# Patient Record
Sex: MALE | Race: WHITE
[De-identification: names, ages, dates, MRNs, and addresses within clinical notes are randomized per-mention and may not be internally consistent; named-entity substitution may affect disease eponyms.]

---

## 2017-04-19 ENCOUNTER — HOSPITAL ENCOUNTER (EMERGENCY)
Dept: HOSPITAL 47 - EC | Age: 22
Discharge: HOME | End: 2017-04-19
Payer: COMMERCIAL

## 2017-04-19 VITALS — RESPIRATION RATE: 16 BRPM | DIASTOLIC BLOOD PRESSURE: 53 MMHG | SYSTOLIC BLOOD PRESSURE: 111 MMHG | HEART RATE: 62 BPM

## 2017-04-19 VITALS — TEMPERATURE: 96.8 F

## 2017-04-19 DIAGNOSIS — R42: ICD-10-CM

## 2017-04-19 DIAGNOSIS — R11.2: Primary | ICD-10-CM

## 2017-04-19 DIAGNOSIS — R19.7: ICD-10-CM

## 2017-04-19 DIAGNOSIS — F17.200: ICD-10-CM

## 2017-04-19 LAB
ALP SERPL-CCNC: 87 U/L (ref 38–126)
ALT SERPL-CCNC: 41 U/L (ref 21–72)
ANION GAP SERPL CALC-SCNC: 11 MMOL/L
AST SERPL-CCNC: 23 U/L (ref 17–59)
BASOPHILS # BLD AUTO: 0.1 K/UL (ref 0–0.2)
BASOPHILS NFR BLD AUTO: 1 %
BUN SERPL-SCNC: 7 MG/DL (ref 9–20)
CALCIUM SPEC-MCNC: 9.9 MG/DL (ref 8.4–10.2)
CH: 30
CHCM: 33.8
CHLORIDE SERPL-SCNC: 107 MMOL/L (ref 98–107)
CO2 SERPL-SCNC: 23 MMOL/L (ref 22–30)
EOSINOPHIL # BLD AUTO: 0 K/UL (ref 0–0.7)
EOSINOPHIL NFR BLD AUTO: 0 %
ERYTHROCYTE [DISTWIDTH] IN BLOOD BY AUTOMATED COUNT: 5.44 M/UL (ref 4.3–5.9)
ERYTHROCYTE [DISTWIDTH] IN BLOOD: 13.4 % (ref 11.5–15.5)
GLUCOSE SERPL-MCNC: 122 MG/DL (ref 74–99)
HCT VFR BLD AUTO: 48.5 % (ref 39–53)
HDW: 2.55
HGB BLD-MCNC: 16 GM/DL (ref 13–17.5)
LUC NFR BLD AUTO: 1 %
LYMPHOCYTES # SPEC AUTO: 1.3 K/UL (ref 1–4.8)
LYMPHOCYTES NFR SPEC AUTO: 8 %
MAGNESIUM SPEC-SCNC: 1.8 MG/DL (ref 1.6–2.3)
MCH RBC QN AUTO: 29.4 PG (ref 25–35)
MCHC RBC AUTO-ENTMCNC: 32.9 G/DL (ref 31–37)
MCV RBC AUTO: 89.2 FL (ref 80–100)
MONOCYTES # BLD AUTO: 0.7 K/UL (ref 0–1)
MONOCYTES NFR BLD AUTO: 4 %
NEUTROPHILS # BLD AUTO: 14.6 K/UL (ref 1.3–7.7)
NEUTROPHILS NFR BLD AUTO: 87 %
NON-AFRICAN AMERICAN GFR(MDRD): >60
POTASSIUM SERPL-SCNC: 4.1 MMOL/L (ref 3.5–5.1)
PROT SERPL-MCNC: 7.3 G/DL (ref 6.3–8.2)
SODIUM SERPL-SCNC: 141 MMOL/L (ref 137–145)
WBC # BLD AUTO: 0.09 10*3/UL
WBC # BLD AUTO: 16.8 K/UL (ref 3.8–10.6)
WBC (PEROX): 16.38

## 2017-04-19 PROCEDURE — 99283 EMERGENCY DEPT VISIT LOW MDM: CPT

## 2017-04-19 PROCEDURE — 80053 COMPREHEN METABOLIC PANEL: CPT

## 2017-04-19 PROCEDURE — 83735 ASSAY OF MAGNESIUM: CPT

## 2017-04-19 PROCEDURE — 85025 COMPLETE CBC W/AUTO DIFF WBC: CPT

## 2017-04-19 PROCEDURE — 36415 COLL VENOUS BLD VENIPUNCTURE: CPT

## 2017-04-19 PROCEDURE — 96360 HYDRATION IV INFUSION INIT: CPT

## 2017-04-19 NOTE — ED
Nausea/Vomiting/Diarrhea HPI





- General


Chief complaint: Nausea/Vomiting/Diarrhea


Stated complaint: Dizziness/Vomiting


Time Seen by Provider: 04/19/17 01:01


Source: patient, RN notes reviewed


Mode of arrival: ambulatory


Limitations: no limitations





- History of Present Illness


Initial comments: 





Patient is a 21-year-old male presents to the emergency room for evaluation of 

nausea, vomiting and diarrhea.  Patient states symptoms began today.  Patient 

states he feels very dizzy after vomiting.  Patient denies abdominal pain.  

Patient denies chest pain or shortness of breath.  Patient denies recent travel 

outside the country.  Patient denies sick contacts.  Patient denies trying new 

foods.  Patient denies chest pain, shortness of breath.  Patient just states he'

s been feeling very nauseous and can't get the vomiting to stop.  Patient 

denies any significant past mental history.  Patient denies any history of 

abdominal surgeries.  Patient states he's had diarrhea for the past 3 days.  

Patient denies blood in stools.  Patient denies any abnormal discoloration of 

stools.





- Related Data


 Previous Rx's











 Medication  Instructions  Recorded


 


Ibuprofen [Motrin] 600 mg PO Q8HR PRN #20 tab 08/22/15


 


Ondansetron Odt [Zofran Odt] 4 mg PO Q8HR PRN #12 tab 04/19/17











 Allergies











Allergy/AdvReac Type Severity Reaction Status Date / Time


 


No Known Allergies Allergy   Verified 04/19/17 00:26














Review of Systems


ROS Statement: 


Those systems with pertinent positive or pertinent negative responses have been 

documented in the HPI.





ROS Other: All systems not noted in ROS Statement are negative.





Past Medical History


Past Medical History: No Reported History


History of Any Multi-Drug Resistant Organisms: None Reported


Past Surgical History: Orthopedic Surgery


Past Psychological History: No Psychological Hx Reported


Smoking Status: Current every day smoker


Past Alcohol Use History: Rare


Past Drug Use History: Marijuana





General Exam





- General Exam Comments


Initial Comments: 





Sitting in exam room, no acute distress.


Limitations: no limitations


General appearance: alert, in no apparent distress


Head exam: Present: atraumatic, normocephalic, normal inspection


Eye exam: Present: normal appearance


ENT exam: Present: normal exam


Neck exam: Present: normal inspection


Respiratory exam: Present: normal lung sounds bilaterally.  Absent: respiratory 

distress


Cardiovascular Exam: Present: regular rate, normal rhythm, normal heart sounds


GI/Abdominal exam: Present: soft, normal bowel sounds.  Absent: distended, 

tenderness, guarding, rebound, rigid


Extremities exam: Present: normal inspection


Back exam: Present: normal inspection


Neurological exam: Present: alert, oriented X3, CN II-XII intact, normal gait


Psychiatric exam: Present: normal affect, normal mood


Skin exam: Present: warm, dry, intact, normal color.  Absent: rash





Course


 Vital Signs











  04/19/17





  00:24


 


Temperature 96.8 F L


 


Pulse Rate 70


 


Respiratory 20





Rate 


 


Blood Pressure 139/77


 


O2 Sat by Pulse 98





Oximetry 














Medical Decision Making





- Medical Decision Making





Patient is a 21-year-old male presents emergency room for impression nausea, 

vomiting and diarrhea.  Patient declined any nausea medication.  Patient was 

given a liter of fluids.  Patient states he is feeling much better and like to 

be discharged home.  Will send patient home with Zofran as needed advised 

patient to drink plenty of fluids.  Patient to follow-up with his primary care 

provider in 24-48 hours for reevaluation.  Patient states he understands 

everything that was discussed with him.  Return parameters discussed.  Case 

discussed with Dr. Vincent.





- Lab Data


Result diagrams: 


 04/19/17 01:05





 04/19/17 01:05


 Lab Results











  04/19/17 04/19/17 Range/Units





  01:05 01:05 


 


WBC  16.8 H   (3.8-10.6)  k/uL


 


RBC  5.44   (4.30-5.90)  m/uL


 


Hgb  16.0   (13.0-17.5)  gm/dL


 


Hct  48.5   (39.0-53.0)  %


 


MCV  89.2   (80.0-100.0)  fL


 


MCH  29.4   (25.0-35.0)  pg


 


MCHC  32.9   (31.0-37.0)  g/dL


 


RDW  13.4   (11.5-15.5)  %


 


Plt Count  252   (150-450)  k/uL


 


Neutrophils %  87   %


 


Lymphocytes %  8   %


 


Monocytes %  4   %


 


Eosinophils %  0   %


 


Basophils %  1   %


 


Neutrophils #  14.6 H   (1.3-7.7)  k/uL


 


Lymphocytes #  1.3   (1.0-4.8)  k/uL


 


Monocytes #  0.7   (0-1.0)  k/uL


 


Eosinophils #  0.0   (0-0.7)  k/uL


 


Basophils #  0.1   (0-0.2)  k/uL


 


Sodium   141  (137-145)  mmol/L


 


Potassium   4.1  (3.5-5.1)  mmol/L


 


Chloride   107  ()  mmol/L


 


Carbon Dioxide   23  (22-30)  mmol/L


 


Anion Gap   11  mmol/L


 


BUN   7 L  (9-20)  mg/dL


 


Creatinine   0.60 L  (0.66-1.25)  mg/dL


 


Est GFR (MDRD) Af Amer   >60  (>60 ml/min/1.73 sqM)  


 


Est GFR (MDRD) Non-Af   >60  (>60 ml/min/1.73 sqM)  


 


Glucose   122 H  (74-99)  mg/dL


 


Calcium   9.9  (8.4-10.2)  mg/dL


 


Magnesium   1.8  (1.6-2.3)  mg/dL


 


Total Bilirubin   0.7  (0.2-1.3)  mg/dL


 


AST   23  (17-59)  U/L


 


ALT   41  (21-72)  U/L


 


Alkaline Phosphatase   87  ()  U/L


 


Total Protein   7.3  (6.3-8.2)  g/dL


 


Albumin   4.5  (3.5-5.0)  g/dL














Disposition


Clinical Impression: 


 Nausea vomiting and diarrhea





Disposition: HOME SELF-CARE


Condition: Good


Instructions:  Gastroenteritis (ED)


Additional Instructions: 


Drink plenty of fluids.  Take Zofran as needed for nausea.  Clear liquid diet.  

Please follow up with primary care provider in 24-48 hours for reevaluation.  

If any new symptom arises or symptoms worsen, return to ER as soon as possible.

  


Prescriptions: 


Ondansetron Odt [Zofran Odt] 4 mg PO Q8HR PRN #12 tab


 PRN Reason: Nausea


Referrals: 


Kilo Deng MD [Primary Care Provider] - 1-2 days


Time of Disposition: 02:30

## 2021-04-22 ENCOUNTER — HOSPITAL ENCOUNTER (EMERGENCY)
Dept: HOSPITAL 47 - EC | Age: 26
Discharge: HOME | End: 2021-04-22
Payer: COMMERCIAL

## 2021-04-22 VITALS — TEMPERATURE: 98 F | DIASTOLIC BLOOD PRESSURE: 68 MMHG | SYSTOLIC BLOOD PRESSURE: 113 MMHG | RESPIRATION RATE: 16 BRPM

## 2021-04-22 VITALS — HEART RATE: 54 BPM

## 2021-04-22 DIAGNOSIS — N28.89: Primary | ICD-10-CM

## 2021-04-22 DIAGNOSIS — F17.200: ICD-10-CM

## 2021-04-22 LAB
ALBUMIN SERPL-MCNC: 4.5 G/DL (ref 3.5–5)
ALP SERPL-CCNC: 102 U/L (ref 38–126)
ALT SERPL-CCNC: 15 U/L (ref 4–49)
ANION GAP SERPL CALC-SCNC: 8 MMOL/L
AST SERPL-CCNC: 20 U/L (ref 17–59)
BASOPHILS # BLD AUTO: 0 K/UL (ref 0–0.2)
BASOPHILS NFR BLD AUTO: 0 %
BUN SERPL-SCNC: 12 MG/DL (ref 9–20)
CALCIUM SPEC-MCNC: 10.2 MG/DL (ref 8.4–10.2)
CHLORIDE SERPL-SCNC: 108 MMOL/L (ref 98–107)
CO2 SERPL-SCNC: 23 MMOL/L (ref 22–30)
EOSINOPHIL # BLD AUTO: 0 K/UL (ref 0–0.7)
EOSINOPHIL NFR BLD AUTO: 0 %
ERYTHROCYTE [DISTWIDTH] IN BLOOD BY AUTOMATED COUNT: 5.44 M/UL (ref 4.3–5.9)
ERYTHROCYTE [DISTWIDTH] IN BLOOD: 12.8 % (ref 11.5–15.5)
GLUCOSE SERPL-MCNC: 138 MG/DL (ref 74–99)
HCT VFR BLD AUTO: 48.1 % (ref 39–53)
HGB BLD-MCNC: 16 GM/DL (ref 13–17.5)
LIPASE SERPL-CCNC: 63 U/L (ref 23–300)
LYMPHOCYTES # SPEC AUTO: 1.6 K/UL (ref 1–4.8)
LYMPHOCYTES NFR SPEC AUTO: 13 %
MCH RBC QN AUTO: 29.3 PG (ref 25–35)
MCHC RBC AUTO-ENTMCNC: 33.2 G/DL (ref 31–37)
MCV RBC AUTO: 88.3 FL (ref 80–100)
MONOCYTES # BLD AUTO: 0.6 K/UL (ref 0–1)
MONOCYTES NFR BLD AUTO: 5 %
NEUTROPHILS # BLD AUTO: 10.3 K/UL (ref 1.3–7.7)
NEUTROPHILS NFR BLD AUTO: 81 %
PH UR: 7.5 [PH] (ref 5–8)
PLATELET # BLD AUTO: 259 K/UL (ref 150–450)
POTASSIUM SERPL-SCNC: 4.1 MMOL/L (ref 3.5–5.1)
PROT SERPL-MCNC: 7.3 G/DL (ref 6.3–8.2)
RBC UR QL: >182 /HPF (ref 0–5)
SODIUM SERPL-SCNC: 139 MMOL/L (ref 137–145)
SP GR UR: >1.05 (ref 1–1.03)
UROBILINOGEN UR QL STRIP: <2 MG/DL (ref ?–2)
WBC # BLD AUTO: 12.7 K/UL (ref 3.8–10.6)
WBC # UR AUTO: 2 /HPF (ref 0–5)

## 2021-04-22 PROCEDURE — 80053 COMPREHEN METABOLIC PANEL: CPT

## 2021-04-22 PROCEDURE — 99284 EMERGENCY DEPT VISIT MOD MDM: CPT

## 2021-04-22 PROCEDURE — 36415 COLL VENOUS BLD VENIPUNCTURE: CPT

## 2021-04-22 PROCEDURE — 83690 ASSAY OF LIPASE: CPT

## 2021-04-22 PROCEDURE — 83605 ASSAY OF LACTIC ACID: CPT

## 2021-04-22 PROCEDURE — 74177 CT ABD & PELVIS W/CONTRAST: CPT

## 2021-04-22 PROCEDURE — 96374 THER/PROPH/DIAG INJ IV PUSH: CPT

## 2021-04-22 PROCEDURE — 85025 COMPLETE CBC W/AUTO DIFF WBC: CPT

## 2021-04-22 PROCEDURE — 96375 TX/PRO/DX INJ NEW DRUG ADDON: CPT

## 2021-04-22 PROCEDURE — 81003 URINALYSIS AUTO W/O SCOPE: CPT

## 2021-04-22 NOTE — ED
Abdominal Pain HPI





- General


Chief Complaint: Abdominal Pain


Stated Complaint: Abd Pain, Vomiting


Source: patient


Mode of arrival: ambulatory


Limitations: no limitations





- History of Present Illness


Initial Comments: 


25-year-old male who presents emergency department with reported right-sided 

abdominal pain.  Patient states that it started early this morning and awoke him

from sleep.  Pain starts around his right upper quadrant radiates around to his 

right back.  It is tender to touch.  Patient has associated nausea and vomiting.

 Denies dysuria, hematuria or difficulty voiding.  Denies diarrhea, 

constipation, melenic stools or hematochezia.  Denies any chest pain or short

ness of breath.  No history of similar in the past.  No penile discharge.  No 

concern for sexually transmitted infections.  No scrotal pain.  No previous 

history of abdominal surgeries.  No other alleviating, precipitating or 

modifying factors








- Related Data


                                Home Medications











 Medication  Instructions  Recorded  Confirmed


 


No Known Home Medications  04/22/21 04/22/21











                                    Allergies











Allergy/AdvReac Type Severity Reaction Status Date / Time


 


No Known Allergies Allergy   Verified 04/22/21 10:26














Review of Systems


ROS Statement: 


Those systems with pertinent positive or pertinent negative responses have been 

documented in the HPI.





ROS Other: All systems not noted in ROS Statement are negative.





Past Medical History


Past Medical History: No Reported History


History of Any Multi-Drug Resistant Organisms: None Reported


Past Surgical History: Orthopedic Surgery


Past Psychological History: No Psychological Hx Reported


Smoking Status: Current every day smoker


Past Alcohol Use History: Rare


Past Drug Use History: Marijuana





General Exam


Limitations: no limitations





Course


                                   Vital Signs











  04/22/21 04/22/21





  09:51 13:41


 


Temperature 97.6 F 98 F


 


Pulse Rate 54 L 54 L


 


Respiratory 18 16





Rate  


 


Blood Pressure 145/82 113/68


 


O2 Sat by Pulse 100 98





Oximetry  














- Reevaluation(s)


Reevaluation #1: 


04/22/21 13:18 spoke with Dr. Amaya.  Patient admits to follow-up in the office.





Medical Decision Making





- Medical Decision Making





Upon arrival patient's placed into room 15.  Thorough history and physical exam 

was performed. he was established.  Patient was given a dose of Toradol and 

Zofran for his pain and nausea.  Laboratory studies were conducted. Urinalysis 

does demonstrate greater than 182 red blood cells.  CT demonstrates complex mass

upper pole left kidney. Spoke with Dr. Amaya in guarded stiffness.  States the 

patient can follow up in office.  These results are discussed the patient and 

the importance of him following up in Dr. Butler's office.  He does understand 

this.  He is take Motrin and Tylenol alternating for his pain.  Return to the 

emergency room for any worsening symptoms.  Patient is discharged in stable 

condition. 





- Lab Data


Result diagrams: 


                                 04/22/21 10:13





                                 04/22/21 10:13


                                   Lab Results











  04/22/21 04/22/21 04/22/21 Range/Units





  10:13 10:13 10:13 


 


WBC  12.7 H    (3.8-10.6)  k/uL


 


RBC  5.44    (4.30-5.90)  m/uL


 


Hgb  16.0    (13.0-17.5)  gm/dL


 


Hct  48.1    (39.0-53.0)  %


 


MCV  88.3    (80.0-100.0)  fL


 


MCH  29.3    (25.0-35.0)  pg


 


MCHC  33.2    (31.0-37.0)  g/dL


 


RDW  12.8    (11.5-15.5)  %


 


Plt Count  259    (150-450)  k/uL


 


MPV  7.6    


 


Neutrophils %  81    %


 


Lymphocytes %  13    %


 


Monocytes %  5    %


 


Eosinophils %  0    %


 


Basophils %  0    %


 


Neutrophils #  10.3 H    (1.3-7.7)  k/uL


 


Lymphocytes #  1.6    (1.0-4.8)  k/uL


 


Monocytes #  0.6    (0-1.0)  k/uL


 


Eosinophils #  0.0    (0-0.7)  k/uL


 


Basophils #  0.0    (0-0.2)  k/uL


 


Sodium   139   (137-145)  mmol/L


 


Potassium   4.1   (3.5-5.1)  mmol/L


 


Chloride   108 H   ()  mmol/L


 


Carbon Dioxide   23   (22-30)  mmol/L


 


Anion Gap   8   mmol/L


 


BUN   12   (9-20)  mg/dL


 


Creatinine   0.82   (0.66-1.25)  mg/dL


 


Est GFR (CKD-EPI)AfAm   >90   (>60 ml/min/1.73 sqM)  


 


Est GFR (CKD-EPI)NonAf   >90   (>60 ml/min/1.73 sqM)  


 


Glucose   138 H   (74-99)  mg/dL


 


Plasma Lactic Acid Kelvin    1.6  (0.7-2.0)  mmol/L


 


Calcium   10.2   (8.4-10.2)  mg/dL


 


Total Bilirubin   0.5   (0.2-1.3)  mg/dL


 


AST   20   (17-59)  U/L


 


ALT   15   (4-49)  U/L


 


Alkaline Phosphatase   102   ()  U/L


 


Total Protein   7.3   (6.3-8.2)  g/dL


 


Albumin   4.5   (3.5-5.0)  g/dL


 


Lipase   63   ()  U/L


 


Urine Color     


 


Urine Appearance     (Clear)  


 


Urine pH     (5.0-8.0)  


 


Ur Specific Gravity     (1.001-1.035)  


 


Urine Protein     (Negative)  


 


Urine Glucose (UA)     (Negative)  


 


Urine Ketones     (Negative)  


 


Urine Blood     (Negative)  


 


Urine Nitrite     (Negative)  


 


Urine Bilirubin     (Negative)  


 


Urine Urobilinogen     (<2.0)  mg/dL


 


Ur Leukocyte Esterase     (Negative)  


 


Urine RBC     (0-5)  /hpf


 


Urine WBC     (0-5)  /hpf


 


Urine Mucus     (None)  /hpf














  04/22/21 Range/Units





  12:09 


 


WBC   (3.8-10.6)  k/uL


 


RBC   (4.30-5.90)  m/uL


 


Hgb   (13.0-17.5)  gm/dL


 


Hct   (39.0-53.0)  %


 


MCV   (80.0-100.0)  fL


 


MCH   (25.0-35.0)  pg


 


MCHC   (31.0-37.0)  g/dL


 


RDW   (11.5-15.5)  %


 


Plt Count   (150-450)  k/uL


 


MPV   


 


Neutrophils %   %


 


Lymphocytes %   %


 


Monocytes %   %


 


Eosinophils %   %


 


Basophils %   %


 


Neutrophils #   (1.3-7.7)  k/uL


 


Lymphocytes #   (1.0-4.8)  k/uL


 


Monocytes #   (0-1.0)  k/uL


 


Eosinophils #   (0-0.7)  k/uL


 


Basophils #   (0-0.2)  k/uL


 


Sodium   (137-145)  mmol/L


 


Potassium   (3.5-5.1)  mmol/L


 


Chloride   ()  mmol/L


 


Carbon Dioxide   (22-30)  mmol/L


 


Anion Gap   mmol/L


 


BUN   (9-20)  mg/dL


 


Creatinine   (0.66-1.25)  mg/dL


 


Est GFR (CKD-EPI)AfAm   (>60 ml/min/1.73 sqM)  


 


Est GFR (CKD-EPI)NonAf   (>60 ml/min/1.73 sqM)  


 


Glucose   (74-99)  mg/dL


 


Plasma Lactic Acid Kelvin   (0.7-2.0)  mmol/L


 


Calcium   (8.4-10.2)  mg/dL


 


Total Bilirubin   (0.2-1.3)  mg/dL


 


AST   (17-59)  U/L


 


ALT   (4-49)  U/L


 


Alkaline Phosphatase   ()  U/L


 


Total Protein   (6.3-8.2)  g/dL


 


Albumin   (3.5-5.0)  g/dL


 


Lipase   ()  U/L


 


Urine Color  Light Yellow  


 


Urine Appearance  Clear  (Clear)  


 


Urine pH  7.5  (5.0-8.0)  


 


Ur Specific Gravity  >1.050 H  (1.001-1.035)  


 


Urine Protein  Negative  (Negative)  


 


Urine Glucose (UA)  Negative  (Negative)  


 


Urine Ketones  Negative  (Negative)  


 


Urine Blood  Large H  (Negative)  


 


Urine Nitrite  Negative  (Negative)  


 


Urine Bilirubin  Negative  (Negative)  


 


Urine Urobilinogen  <2.0  (<2.0)  mg/dL


 


Ur Leukocyte Esterase  Negative  (Negative)  


 


Urine RBC  >182 H  (0-5)  /hpf


 


Urine WBC  2  (0-5)  /hpf


 


Urine Mucus  Rare H  (None)  /hpf














Disposition


Clinical Impression: 


 Right flank pain, Hematuria, Left kidney mass





Disposition: HOME SELF-CARE


Condition: Stable


Instructions (If sedation given, give patient instructions):  Flank Pain (ED)


Additional Instructions: 


You have a mass on your left kidney. you need further workup to determine what 

this mass is. Please call and make an appointment with the urologist. Alternate 

taking Motrin and Tylenol for pain control. Return to the ED for any new or 

worsening symptoms. 


Is patient prescribed a controlled substance at d/c from ED?: No


Referrals: 


Kilo Deng MD [Primary Care Provider] - 1-2 days


Brayan Amaya MD [STAFF PHYSICIAN] - 1-2 days


Time of Disposition: 13:35

## 2021-04-22 NOTE — CT
EXAMINATION TYPE: CT abdomen pelvis w con

 

DATE OF EXAM: 4/22/2021

 

COMPARISON: None

 

HISTORY: Right sided pain with nausea

 

CT DLP: 1408.7 mGycm

 

CONTRAST: 

CT scan of the abdomen and pelvis is performed without Oral Contrast and with IV Contrast, patient in
jected with 100 mL of Isovue 300.

 

FINDINGS: 

LUNG BASES-: Small pleural effusions left greater than right as well as nodular infiltrate right lowe
r lobe linear atelectasis or parenchymal scar at the left lung base.

 

LIVER/GB:   No calcified gallstones.  No space occupying hepatic lesion. Biliary tree is of normal ca
liber. 

 

PANCREAS:  No inflammation.  No distinct mass. 

 

SPLEEN:  No splenic enlargement.  No lesion seen. 

 

ADRENALS:  No nodule.  No thickening. 

 

KIDNEYS/BLADDER:  No hydronephrosis.  No nephrolithiasis. There is a complex mass upper pole left kid
elizabeth posterior cortex measuring 1.8 x 2.4 x 2.9 cm. Malignancy is not excluded. No additional renal le
sions are detected. Urinary bladder grossly unremarkable. 

 

BOWEL: Normal appendix.  Normal bowel caliber.  No inflammation.

 

GENITAL ORGANS:  No gross abnormality. 

 

LYMPH NODES:  No greater than 1cm abdominal or pelvic lymph nodes are appreciated.

 

AORTA: No significant abnormality. 

 

OSSEOUS STRUCTURES:  No significant abnormality is seen. 

 

OTHER:  No significant additional abnormality is seen. 

 

IMPRESSION: 

1. Urology consult recommended for complex mass upper pole left kidney. Malignancy is not excluded.

2. No acute intra-abdominal process seen.

3. Small pleural effusions as well as nodular infiltrate right lower lobe areas of linear atelectasis
 left lung base.

## 2022-09-17 ENCOUNTER — HOSPITAL ENCOUNTER (EMERGENCY)
Dept: HOSPITAL 47 - EC | Age: 27
Discharge: HOME | End: 2022-09-17
Payer: COMMERCIAL

## 2022-09-17 VITALS
SYSTOLIC BLOOD PRESSURE: 124 MMHG | DIASTOLIC BLOOD PRESSURE: 78 MMHG | TEMPERATURE: 97.8 F | RESPIRATION RATE: 15 BRPM | HEART RATE: 60 BPM

## 2022-09-17 DIAGNOSIS — F17.200: ICD-10-CM

## 2022-09-17 DIAGNOSIS — N13.2: Primary | ICD-10-CM

## 2022-09-17 LAB
ALBUMIN SERPL-MCNC: 4.4 G/DL (ref 3.5–5)
ALP SERPL-CCNC: 100 U/L (ref 38–126)
ALT SERPL-CCNC: 16 U/L (ref 4–49)
AMYLASE SERPL-CCNC: 54 U/L (ref 30–110)
ANION GAP SERPL CALC-SCNC: 14 MMOL/L
AST SERPL-CCNC: 23 U/L (ref 17–59)
BASOPHILS # BLD AUTO: 0.1 K/UL (ref 0–0.2)
BASOPHILS NFR BLD AUTO: 0 %
BUN SERPL-SCNC: 10 MG/DL (ref 9–20)
CALCIUM SPEC-MCNC: 9.6 MG/DL (ref 8.4–10.2)
CHLORIDE SERPL-SCNC: 104 MMOL/L (ref 98–107)
CO2 SERPL-SCNC: 19 MMOL/L (ref 22–30)
EOSINOPHIL # BLD AUTO: 0.1 K/UL (ref 0–0.7)
EOSINOPHIL NFR BLD AUTO: 1 %
ERYTHROCYTE [DISTWIDTH] IN BLOOD BY AUTOMATED COUNT: 5.6 M/UL (ref 4.3–5.9)
ERYTHROCYTE [DISTWIDTH] IN BLOOD: 13 % (ref 11.5–15.5)
GLUCOSE SERPL-MCNC: 135 MG/DL (ref 74–99)
HCT VFR BLD AUTO: 51.2 % (ref 39–53)
HGB BLD-MCNC: 16.2 GM/DL (ref 13–17.5)
LIPASE SERPL-CCNC: 95 U/L (ref 23–300)
LYMPHOCYTES # SPEC AUTO: 1.6 K/UL (ref 1–4.8)
LYMPHOCYTES NFR SPEC AUTO: 12 %
MCH RBC QN AUTO: 28.9 PG (ref 25–35)
MCHC RBC AUTO-ENTMCNC: 31.6 G/DL (ref 31–37)
MCV RBC AUTO: 91.5 FL (ref 80–100)
MONOCYTES # BLD AUTO: 0.6 K/UL (ref 0–1)
MONOCYTES NFR BLD AUTO: 5 %
NEUTROPHILS # BLD AUTO: 11 K/UL (ref 1.3–7.7)
NEUTROPHILS NFR BLD AUTO: 82 %
PH UR: 5.5 [PH] (ref 5–8)
PLATELET # BLD AUTO: 257 K/UL (ref 150–450)
POTASSIUM SERPL-SCNC: 4.4 MMOL/L (ref 3.5–5.1)
PROT SERPL-MCNC: 7 G/DL (ref 6.3–8.2)
PROT UR QL: (no result)
RBC UR QL: >182 /HPF (ref 0–5)
SODIUM SERPL-SCNC: 137 MMOL/L (ref 137–145)
SP GR UR: 1.03 (ref 1–1.03)
UROBILINOGEN UR QL STRIP: 2 MG/DL (ref ?–2)
WBC # BLD AUTO: 13.4 K/UL (ref 3.8–10.6)
WBC # UR AUTO: 9 /HPF (ref 0–5)

## 2022-09-17 PROCEDURE — 81001 URINALYSIS AUTO W/SCOPE: CPT

## 2022-09-17 PROCEDURE — 82150 ASSAY OF AMYLASE: CPT

## 2022-09-17 PROCEDURE — 99284 EMERGENCY DEPT VISIT MOD MDM: CPT

## 2022-09-17 PROCEDURE — 74176 CT ABD & PELVIS W/O CONTRAST: CPT

## 2022-09-17 PROCEDURE — 83690 ASSAY OF LIPASE: CPT

## 2022-09-17 PROCEDURE — 96374 THER/PROPH/DIAG INJ IV PUSH: CPT

## 2022-09-17 PROCEDURE — 96375 TX/PRO/DX INJ NEW DRUG ADDON: CPT

## 2022-09-17 PROCEDURE — 80053 COMPREHEN METABOLIC PANEL: CPT

## 2022-09-17 PROCEDURE — 36415 COLL VENOUS BLD VENIPUNCTURE: CPT

## 2022-09-17 PROCEDURE — 85025 COMPLETE CBC W/AUTO DIFF WBC: CPT

## 2022-09-17 PROCEDURE — 96361 HYDRATE IV INFUSION ADD-ON: CPT

## 2022-09-17 NOTE — CT
EXAMINATION TYPE: CT abdomen pelvis wo con

 

DATE OF EXAM: 9/17/2022

 

COMPARISON: 4/22/2021

 

HISTORY: possible kidney stone

 

CT DLP: 673.9 mGycm

Automated exposure control for dose reduction was used.

 

Images obtained from the diaphragm to the floor of the pelvis with no contrast.

 

There is some mild pleural thickening at the posterior lung bases. Heart size is normal. No pericardi
al effusion.

 

There is some linear patchy density at the lung bases consistent with scarring and atelectasis.

 

Liver and spleen are intact. Stomach is intact. No pancreatic mass. Gallbladder is intact.

 

There is no adrenal mass. Kidneys have normal size. There is a 2.5 cm mass in the posterior upper bebe
e left kidney. This is not changed in size compared to old CT scan. There are scattered low density c
alculi in both kidneys. There is some fullness of the right renal pelvis and right ureter. There appe
ars to be a calculus at the right ureterovesical junction measuring 4 mm. Bladder distends smoothly. 
No sign of obstruction on the left side. Left ureter not dilated. No retroperitoneal adenopathy. Appe
ndix is inferior and appears normal. There is no mesenteric edema. No ascites or free air. No sign of
 a bowel obstruction.

 

The lumbar vertebra have normal alignment. No compression fracture. There is developmentally small sp
inal canal and multilevel spinal stenosis from L3 to S1.

 

IMPRESSION:

Obstructing calculus at the right ureterovesical junction. Multiple low density renal calculi. Mild r
ight-sided hydronephrosis and hydroureter.

 

Rounded mass posterior upper pole left kidney not changed in size. Normal appendix.

 

Multilevel lumbar spinal stenosis.

## 2022-09-17 NOTE — ED
Abdominal Pain HPI





- General


Chief Complaint: Abdominal Pain


Stated Complaint: right side pain


Time Seen by Provider: 22 05:59


Source: patient, family, RN notes reviewed


Mode of arrival: ambulatory





- History of Present Illness


Initial Comments: 


This is a 27-year-old male who presents to the emergency department for right 

flank pain and hematuria.  Patient has a history of kidney stones, most recently

1.5 years ago.  This required treatment with a lithotripsy due to the size.  

Symptoms have been present since yesterday. He has associated RLQ pain, nausea, 

and vomiting.





Denies any fevers, chills, sore throat, cough, dyspnea, chest pain, 

palpitations, diarrhea, or headaches.





MD Complaint: abdominal pain


Onset/Timin


-: days(s)


Location: R flank


Associated Symptoms: hematuria





- Related Data


                                  Previous Rx's











 Medication  Instructions  Recorded


 


Cephalexin [Keflex] 500 mg PO Q12HR 7 Days #14 cap 22


 


HYDROcodone/APAP 5-325MG [Norco 1 tab PO Q6HR PRN 3 Days #12 tab 22





5-325]  


 


Ondansetron Odt [Zofran Odt] 4 mg PO Q8HR PRN #20 tab 22


 


Tamsulosin [Flomax] 0.4 mg PO DAILY #7 cap 22











                                    Allergies











Allergy/AdvReac Type Severity Reaction Status Date / Time


 


No Known Allergies Allergy   Verified 22 03:19














Review of Systems


ROS Statement: 


Those systems with pertinent positive or pertinent negative responses have been 

documented in the HPI.





ROS Other: All systems not noted in ROS Statement are negative.





Past Medical History


Past Medical History: No Reported History


History of Any Multi-Drug Resistant Organisms: None Reported


Past Surgical History: Orthopedic Surgery


Past Psychological History: No Psychological Hx Reported


Smoking Status: Current every day smoker


Past Alcohol Use History: Rare


Past Drug Use History: Marijuana





General Exam


General appearance: alert, in distress


Head exam: Present: atraumatic, normocephalic, normal inspection


Respiratory exam: Present: normal lung sounds bilaterally.  Absent: respiratory 

distress, wheezes, rales, rhonchi, stridor


Cardiovascular Exam: Present: regular rate, normal rhythm, normal heart sounds. 

Absent: systolic murmur, diastolic murmur, rubs, gallop, clicks


GI/Abdominal exam: Present: soft, tenderness (RLQ), normal bowel sounds.  

Absent: distended, guarding, rebound, rigid


Back exam: Present: CVA tenderness (R)


Neurological exam: Present: alert, oriented X3, CN II-XII intact


Psychiatric exam: Present: normal affect, normal mood


Skin exam: Present: warm, dry, intact, normal color.  Absent: rash





Course


                                   Vital Signs











  22





  03:19


 


Temperature 97.8 F


 


Pulse Rate 60


 


Respiratory 15





Rate 


 


Blood Pressure 124/78


 


O2 Sat by Pulse 98





Oximetry 














Medical Decision Making





- Medical Decision Making


This is a 27-year-old male who presents to the emergency department for right 

flank pain and hematuria.  CBC reveals leukocytosis and urinalysis reveals 

hematuria. Computed tomography scan of the abdomen and pelvis obtained revealing

an obstructing 4mm renal calculus at the right UVJ with mild hydronephrosis.  

Patient's symptoms were managed in the emergency department.  Rx for Norco, 

Zofran, Keflex, and Flomax provided.  He is advised to take ibuprofen and 

Tylenol as needed for his pain and to use the Norco sparingly when the symptoms 

are the most severe.  The Keflex will be taken for 7 days, the Flomax until his 

pain resolves or he notices that he passes the kidney stone.  Zofran taken up to

every 8 hours needed for nausea and vomiting.  Advised to remain well-hydrated. 

Information for urology follow-up was provided, instructed him to make an 

appointment.





Return precautions reviewed in depth, the patient is instructed to return to the

emergency department with any new, worsening, or concerning symptoms. Patient 

verbalized understanding. 





This case was discussed in detail with the attending ED physician. Presentation,

findings, and treatment plan discussed in detail as well. 








- Lab Data


Result diagrams: 


                                 22 04:19





                                 22 04:19


                                   Lab Results











  22 Range/Units





  04:19 04:19 04:37 


 


WBC  13.4 H    (3.8-10.6)  k/uL


 


RBC  5.60    (4.30-5.90)  m/uL


 


Hgb  16.2    (13.0-17.5)  gm/dL


 


Hct  51.2    (39.0-53.0)  %


 


MCV  91.5    (80.0-100.0)  fL


 


MCH  28.9    (25.0-35.0)  pg


 


MCHC  31.6    (31.0-37.0)  g/dL


 


RDW  13.0    (11.5-15.5)  %


 


Plt Count  257    (150-450)  k/uL


 


MPV  7.7    


 


Neutrophils %  82    %


 


Lymphocytes %  12    %


 


Monocytes %  5    %


 


Eosinophils %  1    %


 


Basophils %  0    %


 


Neutrophils #  11.0 H    (1.3-7.7)  k/uL


 


Lymphocytes #  1.6    (1.0-4.8)  k/uL


 


Monocytes #  0.6    (0-1.0)  k/uL


 


Eosinophils #  0.1    (0-0.7)  k/uL


 


Basophils #  0.1    (0-0.2)  k/uL


 


Sodium   137   (137-145)  mmol/L


 


Potassium   4.4   (3.5-5.1)  mmol/L


 


Chloride   104   ()  mmol/L


 


Carbon Dioxide   19 L   (22-30)  mmol/L


 


Anion Gap   14   mmol/L


 


BUN   10   (9-20)  mg/dL


 


Creatinine   0.72   (0.66-1.25)  mg/dL


 


Est GFR (CKD-EPI)AfAm   >90   (>60 ml/min/1.73 sqM)  


 


Est GFR (CKD-EPI)NonAf   >90   (>60 ml/min/1.73 sqM)  


 


Glucose   135 H   (74-99)  mg/dL


 


Calcium   9.6   (8.4-10.2)  mg/dL


 


Total Bilirubin   0.6   (0.2-1.3)  mg/dL


 


AST   23   (17-59)  U/L


 


ALT   16   (4-49)  U/L


 


Alkaline Phosphatase   100   ()  U/L


 


Total Protein   7.0   (6.3-8.2)  g/dL


 


Albumin   4.4   (3.5-5.0)  g/dL


 


Amylase   54   ()  U/L


 


Lipase   95   ()  U/L


 


Urine Color    Dark Brown  


 


Urine Appearance    Turbid  (Clear)  


 


Urine pH    5.5  (5.0-8.0)  


 


Ur Specific Gravity    1.030  (1.001-1.035)  


 


Urine Protein    2+ H  (Negative)  


 


Urine Glucose (UA)    Negative  (Negative)  


 


Urine Ketones    Trace H  (Negative)  


 


Urine Blood    Large H  (Negative)  


 


Urine Nitrite    Negative  (Negative)  


 


Urine Bilirubin    Negative  (Negative)  


 


Urine Urobilinogen    2.0  (<2.0)  mg/dL


 


Ur Leukocyte Esterase    Small H  (Negative)  


 


Urine RBC    >182 H  (0-5)  /hpf


 


Urine WBC    9 H  (0-5)  /hpf


 


Urine Mucus    Many H  (None)  /hpf














- Radiology Data


Radiology results: report reviewed, image reviewed





Disposition


Clinical Impression: 


 Renal calculus, right





Disposition: HOME SELF-CARE


Instructions (If sedation given, give patient instructions):  Kidney Stones 

(ED), Renal Colic (ED)


Additional Instructions: 


Return to the emergency department with any new, worsening, or concerning 

symptoms.  Alternate with ibuprofen and Tylenol as needed for pain relief.  Take

the Norco sparingly when your pain is the most severe.  Take the Zofran up to 

every 8 hours as needed for nausea and vomiting.  Take the Flomax daily until 

your pain resolves or you notice that you passed a kidney stone.  Take the 

antibiotic as prescribed for 7 days.  Contact urology for a follow-up 

appointment.


Prescriptions: 


Tamsulosin [Flomax] 0.4 mg PO DAILY #7 cap


Cephalexin [Keflex] 500 mg PO Q12HR 7 Days #14 cap


HYDROcodone/APAP 5-325MG [Norco 5-325] 1 tab PO Q6HR PRN 3 Days #12 tab


 PRN Reason: Pain


Ondansetron Odt [Zofran Odt] 4 mg PO Q8HR PRN #20 tab


 PRN Reason: Nausea And Vomiting


Is patient prescribed a controlled substance at d/c from ED?: Yes


When asked, does pt state using other controlled substances?: No


If prescribed controlled substance>3 days was MAPS reviewed?: Prescribed <3 Days


Referrals: 


Kilo Deng MD [Primary Care Provider] - 1-2 days


Sam Gruber MD [STAFF PHYSICIAN] - 1-2 days